# Patient Record
Sex: FEMALE | Race: WHITE | NOT HISPANIC OR LATINO | URBAN - NONMETROPOLITAN AREA
[De-identification: names, ages, dates, MRNs, and addresses within clinical notes are randomized per-mention and may not be internally consistent; named-entity substitution may affect disease eponyms.]

---

## 2024-07-24 ENCOUNTER — APPOINTMENT (OUTPATIENT)
Dept: URBAN - NONMETROPOLITAN AREA CLINIC 45 | Age: 49
Setting detail: DERMATOLOGY
End: 2024-07-27

## 2024-07-24 DIAGNOSIS — L03.01 CELLULITIS OF FINGER: ICD-10-CM

## 2024-07-24 PROBLEM — L03.011 CELLULITIS OF RIGHT FINGER: Status: ACTIVE | Noted: 2024-07-24

## 2024-07-24 PROBLEM — L03.012 CELLULITIS OF LEFT FINGER: Status: ACTIVE | Noted: 2024-07-24

## 2024-07-24 PROCEDURE — OTHER PHOTO-DOCUMENTATION: OTHER

## 2024-07-24 PROCEDURE — 99204 OFFICE O/P NEW MOD 45 MIN: CPT

## 2024-07-24 PROCEDURE — OTHER COUNSELING: OTHER

## 2024-07-24 PROCEDURE — OTHER PRESCRIPTION MEDICATION MANAGEMENT: OTHER

## 2024-07-24 PROCEDURE — OTHER PRESCRIPTION: OTHER

## 2024-07-24 PROCEDURE — OTHER MIPS QUALITY: OTHER

## 2024-07-24 RX ORDER — RUXOLITINIB 15 MG/G
APPLY CREAM TOPICAL AS DIRECTED
Qty: 60 | Refills: 4 | Status: ERX | COMMUNITY
Start: 2024-07-24

## 2024-07-24 RX ORDER — HALOBETASOL PROPIONATE 0.5 MG/G
APPLY OINTMENT TOPICAL AS DIRECTED
Qty: 50 | Refills: 4 | Status: ERX | COMMUNITY
Start: 2024-07-24

## 2024-07-24 ASSESSMENT — LOCATION SIMPLE DESCRIPTION DERM
LOCATION SIMPLE: RIGHT GREAT TOE
LOCATION SIMPLE: LEFT 2ND TOE
LOCATION SIMPLE: LEFT MIDDLE FINGER
LOCATION SIMPLE: RIGHT MIDDLE FINGER

## 2024-07-24 ASSESSMENT — LOCATION DETAILED DESCRIPTION DERM
LOCATION DETAILED: LEFT DISTAL DORSAL MIDDLE FINGER
LOCATION DETAILED: RIGHT MID DORSAL MIDDLE FINGER
LOCATION DETAILED: LEFT 2ND TOENAIL
LOCATION DETAILED: RIGHT DORSAL GREAT TOE

## 2024-07-24 ASSESSMENT — LOCATION ZONE DERM
LOCATION ZONE: FINGER
LOCATION ZONE: TOE
LOCATION ZONE: TOENAIL

## 2024-07-24 NOTE — HPI: DRY SKIN
Is This A New Presentation Or A Follow-Up?: Dry Skin
Additional History: Pt has tried the following: Triamcinolone, Eucrisa, clobetasol, clindamycin (gel and lotion), fluocinonide, betamethasone, mometasone, clotrimazole, tape. Pt states condition is gotten worse through the years. Pt did have biopsy years ago but doesn’t remember who did it.

## 2024-07-24 NOTE — PROCEDURE: PRESCRIPTION MEDICATION MANAGEMENT
Render In Strict Bullet Format?: No
Plan: Chronic paronychia of unknown etiology.\\n\\nPt to alternate between halobetasol ointment and Opzelura every other day \\n\\nRecommend switching moisturizer cream to plain Vaseline \\nAvoid fragrances and dyes\\nRecommend antihistamines in the morning and at night (Pepcid, Allegra, or Xyzal)\\nRecommend followup with allergist for patch testing (pt with history of allergies in the past)\\n\\nPlan followup in 2 weeks for re-evaluation. Consider trial of PO antifungal. Consider repeat bx.
Initiate Treatment: Opzelura 1.5 % topical cream Apply twice a day\\n\\nhalobetasol propionate 0.05 % topical ointment Apply twice a day
Detail Level: Zone

## 2024-12-04 ENCOUNTER — APPOINTMENT (OUTPATIENT)
Dept: URBAN - NONMETROPOLITAN AREA CLINIC 45 | Age: 49
Setting detail: DERMATOLOGY
End: 2024-12-04

## 2024-12-04 DIAGNOSIS — L03.01 CELLULITIS OF FINGER: ICD-10-CM

## 2024-12-04 PROBLEM — L03.012 CELLULITIS OF LEFT FINGER: Status: ACTIVE | Noted: 2024-12-04

## 2024-12-04 PROBLEM — L03.011 CELLULITIS OF RIGHT FINGER: Status: ACTIVE | Noted: 2024-12-04

## 2024-12-04 PROBLEM — L08.9 LOCAL INFECTION OF THE SKIN AND SUBCUTANEOUS TISSUE, UNSPECIFIED: Status: ACTIVE | Noted: 2024-12-04

## 2024-12-04 PROCEDURE — OTHER COUNSELING: OTHER

## 2024-12-04 PROCEDURE — OTHER MIPS QUALITY: OTHER

## 2024-12-04 PROCEDURE — OTHER BIOPSY BY PUNCH METHOD: OTHER

## 2024-12-04 PROCEDURE — 11104 PUNCH BX SKIN SINGLE LESION: CPT

## 2024-12-04 PROCEDURE — 99214 OFFICE O/P EST MOD 30 MIN: CPT | Mod: 25

## 2024-12-04 PROCEDURE — OTHER PRESCRIPTION MEDICATION MANAGEMENT: OTHER

## 2024-12-04 PROCEDURE — OTHER ADDITIONAL NOTES: OTHER

## 2024-12-04 ASSESSMENT — LOCATION DETAILED DESCRIPTION DERM
LOCATION DETAILED: LEFT MIDDLE FINGERTIP
LOCATION DETAILED: RIGHT DORSAL GREAT TOE
LOCATION DETAILED: RIGHT MID DORSAL MIDDLE FINGER
LOCATION DETAILED: LEFT 2ND TOENAIL
LOCATION DETAILED: LEFT DISTAL DORSAL MIDDLE FINGER

## 2024-12-04 ASSESSMENT — LOCATION ZONE DERM
LOCATION ZONE: FINGER
LOCATION ZONE: TOE
LOCATION ZONE: TOENAIL

## 2024-12-04 ASSESSMENT — LOCATION SIMPLE DESCRIPTION DERM
LOCATION SIMPLE: LEFT MIDDLE FINGER
LOCATION SIMPLE: LEFT 2ND TOE
LOCATION SIMPLE: RIGHT MIDDLE FINGER
LOCATION SIMPLE: RIGHT GREAT TOE

## 2024-12-04 NOTE — PROCEDURE: BIOPSY BY PUNCH METHOD
Detail Level: Detailed
Was A Bandage Applied: Yes
Punch Size In Mm: 3
Size Of Lesion In Cm (Optional): 0
Depth Of Punch Biopsy: dermis
Biopsy Type: H and E
Anesthesia Type: 1% lidocaine with epinephrine and a 1:10 solution of 8.4% sodium bicarbonate
Anesthesia Volume In Cc: 0.5
Hemostasis: Pressure
Epidermal Sutures: gel foam
Wound Care: Petrolatum
Dressing: bandage
Patient Will Remove Sutures At Home?: No
Lab: [id number]
Lab Facility: [id number]
Consent: Written consent was obtained and risks were reviewed including but not limited to scarring, infection, bleeding, scabbing, incomplete removal, nerve damage and allergy to anesthesia.
Post-Care Instructions: I reviewed with the patient in detail post-care instructions. Patient is to keep the biopsy site dry overnight, and then apply bacitracin twice daily until healed. Patient may apply hydrogen peroxide soaks to remove any crusting.
Home Suture Removal Text: Patient was provided a home suture removal kit and will remove their sutures at home.  If they have any questions or difficulties they will call the office.
Notification Instructions: Patient will be notified of biopsy results. However, patient instructed to call the office if not contacted within 2 weeks.
Billing Type: Third-Party Bill
Information: Selecting Yes will display possible errors in your note based on the variables you have selected. This validation is only offered as a suggestion for you. PLEASE NOTE THAT THE VALIDATION TEXT WILL BE REMOVED WHEN YOU FINALIZE YOUR NOTE. IF YOU WANT TO FAX A PRELIMINARY NOTE YOU WILL NEED TO TOGGLE THIS TO 'NO' IF YOU DO NOT WANT IT IN YOUR FAXED NOTE.

## 2024-12-04 NOTE — PROCEDURE: PRESCRIPTION MEDICATION MANAGEMENT
Render In Strict Bullet Format?: No
Plan: Continue halobetasol ointment \\n\\nRecommend switching moisturizer cream to plain Vaseline \\nAvoid fragrances and dyes\\nRecommend antihistamines in the morning and at night (Pepcid, Allegra, or Xyzal)\\nRecommend followup with allergist for patch testing (pt with history of allergies in the past)\\n\\nPt with longstanding history of this rash. Per pt, rheumatologist does not think this rash has to do with her rheumatology history. Per pt, previous bx showed inflammation. 
Detail Level: Zone

## 2024-12-04 NOTE — PROCEDURE: ADDITIONAL NOTES
Render Risk Assessment In Note?: no
Detail Level: Simple
Additional Notes: Consider dx of lupus pernio (sarcoidosis)

## 2024-12-11 ENCOUNTER — APPOINTMENT (OUTPATIENT)
Dept: URBAN - NONMETROPOLITAN AREA CLINIC 45 | Age: 49
Setting detail: DERMATOLOGY
End: 2024-12-11

## 2024-12-11 DIAGNOSIS — L03.01 CELLULITIS OF FINGER: ICD-10-CM

## 2024-12-11 PROBLEM — L03.012 CELLULITIS OF LEFT FINGER: Status: ACTIVE | Noted: 2024-12-11

## 2024-12-11 PROBLEM — L03.011 CELLULITIS OF RIGHT FINGER: Status: ACTIVE | Noted: 2024-12-11

## 2024-12-11 PROCEDURE — OTHER REFERRAL: OTHER

## 2024-12-11 ASSESSMENT — LOCATION ZONE DERM
LOCATION ZONE: TOE
LOCATION ZONE: FINGER

## 2024-12-11 ASSESSMENT — LOCATION SIMPLE DESCRIPTION DERM
LOCATION SIMPLE: LEFT INDEX FINGER
LOCATION SIMPLE: PLANTAR SURFACE OF LEFT 1ST TOE
LOCATION SIMPLE: PLANTAR SURFACE OF RIGHT 1ST TOE

## 2024-12-11 ASSESSMENT — LOCATION DETAILED DESCRIPTION DERM
LOCATION DETAILED: TIP OF LEFT 1ST TOE
LOCATION DETAILED: RIGHT MEDIAL PLANTAR 1ST TOE
LOCATION DETAILED: LEFT INDEX FINGERTIP

## 2024-12-12 ENCOUNTER — RX ONLY (RX ONLY)
Age: 49
End: 2024-12-12

## 2024-12-12 RX ORDER — FLUCONAZOLE 150 MG/1
TABLET ORAL
Qty: 4 | Refills: 1 | Status: ERX | COMMUNITY
Start: 2024-12-12

## 2024-12-13 ENCOUNTER — RX ONLY (RX ONLY)
Age: 49
End: 2024-12-13

## 2024-12-13 RX ORDER — PHARMACY COMPOUNDING ACCESSORY
EACH MISCELLANEOUS
Qty: 30 | Refills: 2 | Status: ERX | COMMUNITY
Start: 2024-12-13

## 2025-01-08 ENCOUNTER — APPOINTMENT (OUTPATIENT)
Dept: URBAN - NONMETROPOLITAN AREA CLINIC 45 | Age: 50
Setting detail: DERMATOLOGY
End: 2025-01-10

## 2025-01-08 DIAGNOSIS — L03.01 CELLULITIS OF FINGER: ICD-10-CM

## 2025-01-08 PROBLEM — L03.011 CELLULITIS OF RIGHT FINGER: Status: ACTIVE | Noted: 2025-01-08

## 2025-01-08 PROBLEM — L03.012 CELLULITIS OF LEFT FINGER: Status: ACTIVE | Noted: 2025-01-08

## 2025-01-08 PROCEDURE — OTHER PRESCRIPTION MEDICATION MANAGEMENT: OTHER

## 2025-01-08 PROCEDURE — 99214 OFFICE O/P EST MOD 30 MIN: CPT

## 2025-01-08 PROCEDURE — OTHER COUNSELING: OTHER

## 2025-01-08 PROCEDURE — OTHER ADDITIONAL NOTES: OTHER

## 2025-01-08 PROCEDURE — OTHER MIPS QUALITY: OTHER

## 2025-01-08 PROCEDURE — OTHER PHOTO-DOCUMENTATION: OTHER

## 2025-01-08 ASSESSMENT — LOCATION ZONE DERM
LOCATION ZONE: TOE
LOCATION ZONE: FINGER
LOCATION ZONE: TOENAIL

## 2025-01-08 ASSESSMENT — LOCATION SIMPLE DESCRIPTION DERM
LOCATION SIMPLE: RIGHT MIDDLE FINGER
LOCATION SIMPLE: RIGHT GREAT TOE
LOCATION SIMPLE: LEFT 2ND TOE
LOCATION SIMPLE: LEFT MIDDLE FINGER

## 2025-01-08 ASSESSMENT — LOCATION DETAILED DESCRIPTION DERM
LOCATION DETAILED: LEFT 2ND TOENAIL
LOCATION DETAILED: RIGHT MID DORSAL MIDDLE FINGER
LOCATION DETAILED: LEFT DISTAL DORSAL MIDDLE FINGER
LOCATION DETAILED: RIGHT DORSAL GREAT TOE

## 2025-01-08 NOTE — PROCEDURE: ADDITIONAL NOTES
Detail Level: Simple
Render Risk Assessment In Note?: no
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 We are treating as unusual presentation of Raynaud's phenomenon. Pt has seen improvement with topical nifedipine so far.\\n

## 2025-01-08 NOTE — PROCEDURE: PRESCRIPTION MEDICATION MANAGEMENT
Render In Strict Bullet Format?: No
Plan: Continue halobetasol ointment \\nRecommend switching moisturizer cream to plain Vaseline \\nAvoid fragrances and dyes\\nRecommend antihistamines in the morning and at night (Pepcid, Allegra, or Xyzal)\\nRecommend pt try to find an  allergist that is in network for patch testing (pt with history of allergies in the past)\\nContinue Rx topical nifedipine 0.3% ointment daily to [facility]
Detail Level: Zone